# Patient Record
Sex: MALE | Race: WHITE | Employment: FULL TIME | URBAN - METROPOLITAN AREA
[De-identification: names, ages, dates, MRNs, and addresses within clinical notes are randomized per-mention and may not be internally consistent; named-entity substitution may affect disease eponyms.]

---

## 2021-10-30 ENCOUNTER — HOSPITAL ENCOUNTER (OUTPATIENT)
Age: 46
Discharge: HOME | End: 2021-10-30
Payer: COMMERCIAL

## 2021-10-30 VITALS
SYSTOLIC BLOOD PRESSURE: 154 MMHG | HEART RATE: 85 BPM | WEIGHT: 165 LBS | OXYGEN SATURATION: 99 % | DIASTOLIC BLOOD PRESSURE: 90 MMHG | TEMPERATURE: 98 F | RESPIRATION RATE: 18 BRPM

## 2021-10-30 DIAGNOSIS — R09.81 NASAL CONGESTION: Primary | ICD-10-CM

## 2021-10-30 PROCEDURE — 99203 OFFICE O/P NEW LOW 30 MIN: CPT

## 2021-10-30 PROCEDURE — 99202 OFFICE O/P NEW SF 15 MIN: CPT

## 2021-10-30 RX ORDER — TADALAFIL 20 MG/1
1 TABLET ORAL AS NEEDED
COMMUNITY
Start: 2021-05-07

## 2021-10-30 RX ORDER — VALACYCLOVIR HYDROCHLORIDE 1 G/1
1 TABLET, FILM COATED ORAL DAILY
COMMUNITY
Start: 2021-02-10

## 2021-10-30 NOTE — ED PROVIDER NOTES
Patient Seen in: [facility]      History   Patient presents with:  Runny Nose    Stated Complaint: Covid Test-Cough ,Congestion     Subjective:   HPI    Patient presents to the urgent care with report of nasal congestion since Wednesday. atraumatic  Eyes: EOMI; PERRLA  Ears: TM partial occlusion with cerumen on left, full cerumen occlusion on right, patient educated on stopping Q-tips, and on Debrox drops.     Throat: no tonsillar exudates or erythema appreciated,  Neck: Supple, Trachea Mid Impression:  Nasal congestion  (primary encounter diagnosis)     Disposition:  Discharge  10/30/2021 10:23 am    Follow-up:  Leandro Allen [address] [place] [address] Robyn Schwab  [phone]    Schedule an appointment as soon as possible for a visit

## 2021-10-30 NOTE — ED INITIAL ASSESSMENT (HPI)
Patient here for evaluation of runny nose and congestion that started about 3 days ago. States the nasal drainage was initially yellow and now more white in color. Only coughing because of post nasal drip.  Denies any fevers, chills, N/V/D, loss of taste or